# Patient Record
Sex: FEMALE | Race: WHITE | NOT HISPANIC OR LATINO
[De-identification: names, ages, dates, MRNs, and addresses within clinical notes are randomized per-mention and may not be internally consistent; named-entity substitution may affect disease eponyms.]

---

## 2017-05-16 ENCOUNTER — TRANSCRIPTION ENCOUNTER (OUTPATIENT)
Age: 62
End: 2017-05-16

## 2018-06-27 ENCOUNTER — TRANSCRIPTION ENCOUNTER (OUTPATIENT)
Age: 63
End: 2018-06-27

## 2019-04-10 ENCOUNTER — RECORD ABSTRACTING (OUTPATIENT)
Age: 64
End: 2019-04-10

## 2019-04-10 DIAGNOSIS — M65.30 TRIGGER FINGER, UNSPECIFIED FINGER: ICD-10-CM

## 2019-04-10 DIAGNOSIS — G43.909 MIGRAINE, UNSPECIFIED, NOT INTRACTABLE, W/OUT STATUS MIGRAINOSUS: ICD-10-CM

## 2019-04-10 PROBLEM — Z00.00 ENCOUNTER FOR PREVENTIVE HEALTH EXAMINATION: Status: ACTIVE | Noted: 2019-04-10

## 2019-04-10 RX ORDER — PROPRANOLOL HYDROCHLORIDE 60 MG/1
60 TABLET ORAL
Refills: 0 | Status: ACTIVE | COMMUNITY

## 2019-04-10 RX ORDER — BUTALB/ACETAMINOPHEN/CAFFEINE 50-325-40
TABLET ORAL
Refills: 0 | Status: ACTIVE | COMMUNITY

## 2019-04-10 RX ORDER — DICLOFENAC SODIUM 75 MG/1
75 TABLET, DELAYED RELEASE ORAL
Refills: 0 | Status: ACTIVE | COMMUNITY

## 2019-04-10 RX ORDER — ESTRADIOL 2 MG/1
SYSTEM VAGINAL
Refills: 0 | Status: ACTIVE | COMMUNITY

## 2019-04-10 RX ORDER — TRIAMCINOLONE ACETONIDE 40 MG/ML
40 INJECTION, SUSPENSION INTRA-ARTICULAR; INTRAMUSCULAR
Refills: 0 | Status: ACTIVE | COMMUNITY

## 2019-04-17 ENCOUNTER — APPOINTMENT (OUTPATIENT)
Dept: ORTHOPEDIC SURGERY | Facility: CLINIC | Age: 64
End: 2019-04-17
Payer: MEDICARE

## 2019-04-17 VITALS — WEIGHT: 135 LBS | HEIGHT: 66 IN | BODY MASS INDEX: 21.69 KG/M2

## 2019-04-17 DIAGNOSIS — M25.512 PAIN IN LEFT SHOULDER: ICD-10-CM

## 2019-04-17 PROCEDURE — 20552 NJX 1/MLT TRIGGER POINT 1/2: CPT | Mod: LT

## 2019-04-17 PROCEDURE — 99214 OFFICE O/P EST MOD 30 MIN: CPT | Mod: 25

## 2019-04-17 NOTE — DISCUSSION/SUMMARY
[de-identified] : Patient reports a one month history of left-sided periscapular shoulder pain. No specific injury pain is worse when sitting at a desk\par \par None pain in the shoulder no radiating pain numbness or weakness\par \par Trigger point injections administered\par Home exercise demonstrated provided\par Patient referred to my website to watch videos regarding trigger point explanation and exercise program

## 2019-04-17 NOTE — HISTORY OF PRESENT ILLNESS
[Pain Location] : pain [] : left shoulder [6] : a current pain level of 6/10 [Intermit.] : ~He/She~ states the symptoms seem to be intermittent [Lifting] : worsened by lifting [de-identified] : LEFT SHOULDER\par MARCH 2019\par INTERMITTENT PAIN\par 6/10 PAIN LEVEL\par SHARP PAIN\par WORSE WHEN LIFTING\par \par PREVIOUS RIGHT SHOULDER PAIN

## 2019-04-17 NOTE — PROCEDURE
[de-identified] : Patient has demonstrated limited relief from NSAIDS, rest, exercises / PT, and after discussion of the risks and benefits, the patient elected to proceed with a trigger point injection into the \par LEFT LEVATOR and TRAPEZIOUS\par \par \par  I confirmed no prior adverse reactions, no active infections, and no relevant allergies. \par The skin was prepped in the usual sterile manner. The site was injected with local anesthetic followed by local needling. \par The injection was completed without complication and a bandage was applied.\par  \par The patient tolerated the procedure well and was given post-injection instructions. Cold Tx x 48 hours, analgesics. prn \par Medications: 1.5cc of 1% xylocaine + 1.5cc .25% Bupivicaine + KENALOG 1MG  per site\par

## 2019-05-23 ENCOUNTER — TRANSCRIPTION ENCOUNTER (OUTPATIENT)
Age: 64
End: 2019-05-23

## 2020-06-10 ENCOUNTER — APPOINTMENT (OUTPATIENT)
Dept: ORTHOPEDIC SURGERY | Facility: CLINIC | Age: 65
End: 2020-06-10

## 2020-06-18 ENCOUNTER — APPOINTMENT (OUTPATIENT)
Dept: ORTHOPEDIC SURGERY | Facility: CLINIC | Age: 65
End: 2020-06-18

## 2020-09-10 ENCOUNTER — APPOINTMENT (OUTPATIENT)
Dept: ORTHOPEDIC SURGERY | Facility: CLINIC | Age: 65
End: 2020-09-10
Payer: MEDICARE

## 2020-09-10 VITALS — BODY MASS INDEX: 21.69 KG/M2 | HEIGHT: 66 IN | WEIGHT: 135 LBS

## 2020-09-10 DIAGNOSIS — M77.10 LATERAL EPICONDYLITIS, UNSPECIFIED ELBOW: ICD-10-CM

## 2020-09-10 PROCEDURE — 99214 OFFICE O/P EST MOD 30 MIN: CPT

## 2020-09-10 PROCEDURE — 73070 X-RAY EXAM OF ELBOW: CPT | Mod: 50

## 2020-09-10 NOTE — ASSESSMENT
[FreeTextEntry1] : Discussed at length with patient exam history and imaging.  Patient elected this time counterforce bracing as well as home exercises.  If no improvement patient consider cortisone injection\par

## 2020-09-10 NOTE — PHYSICAL EXAM
[de-identified] : BILATERAL elbow\par Constitutional: \par The patient is healthy-appearing and in no apparent distress. \par \par Cardiovascular System: \par There is capillary refill less than 2 seconds. \par \par Skin: \par There is no skin abnormalities of elbow.\par \par Bilateral Elbow: \par Appearance: \par There is no deformity, induration, redness, swelling, or warmth and a normal carrying angle. \par \par Bony Palpation: \par There is no tenderness of the medial epicondyle.\par There is no tenderness of olecranon.\par There is no tenderness of the ulnatrochlea articulation.\par There is no tenderness of the coronoid process.\par There is no tenderness of the radial head.\par There is no tenderness of the radiocapitellar joint.\par There is tenderness of the lateral epicondyle. \par \par Soft Tissue Palpation: \par There is no tenderness of the ulnar nerve.\par There is no tenderness of the biceps insertion.\par There is no tenderness of the pronator teres.\par There is no tenderness of the flexor carpi ulnaris.\par There is no tenderness of the flexor carpi radialis.\par There is no tenderness of the annular ligament of the radius.\par There is no tenderness of the brachioradialis.\par There is no tenderness of the radial collateral ligament.\par There is no tenderness of the ulnar collateral ligament.\par There is no tenderness of the antecubital fossa.\par There is tenderness of the extensor carpi radialis brevis.\par There is tenderness of the extensor carpi radialis longus.\par \par Range of Motion:  \par There is full range of motion both actively and passively. \par \par Stability:\par There is no dislocation or laxity to testing.\par  \par Strength: \par There is 5/5 elbow flexion, extension, supination and pronation.  \par \par Neurologic:\par There is normal sensation C5-T1 to light touch. \par \par Psychiatric: \par The patient demonstrates a normal mood and affect and is active and alert.\par  [de-identified] : X-ray BILATERAL elbows.  There is no significant bony / soft tissue abnormality, arthritis, or fracture.\par

## 2020-09-10 NOTE — HISTORY OF PRESENT ILLNESS
[de-identified] : Location: Bilateral elbows (right worse than left)- lateral\par Quality: aching, sharp\par Duration: 1 month\par Context: atraumatic\par Aggravating factors: direct pressure, lifting\par Conservative treatment: n/a\par Associated symptoms: swelling\par Prior studies: n/a\par \par

## 2021-12-10 ENCOUNTER — APPOINTMENT (OUTPATIENT)
Dept: ORTHOPEDIC SURGERY | Facility: CLINIC | Age: 66
End: 2021-12-10
Payer: MEDICARE

## 2021-12-10 DIAGNOSIS — M25.551 PAIN IN RIGHT HIP: ICD-10-CM

## 2021-12-10 PROCEDURE — 99214 OFFICE O/P EST MOD 30 MIN: CPT

## 2021-12-10 RX ORDER — METHYLPREDNISOLONE 4 MG/1
4 TABLET ORAL
Qty: 1 | Refills: 2 | Status: ACTIVE | COMMUNITY
Start: 2021-12-10 | End: 1900-01-01

## 2021-12-10 NOTE — DISCUSSION/SUMMARY
[de-identified] : Patient clinical exam and history of consistent with right-sided lumbar radiculopathy. She is clinical exam indicates no restriction and no evidence of any significant arthritis of her hip. Patient and I discussed options she would do well with around a Medrol Dosepak for insertion of the medication and patient will follow if not improved that point an MRI the hip and lumbar spine in order to

## 2021-12-10 NOTE — HISTORY OF PRESENT ILLNESS
[de-identified] : Patient presents today with chronic retracted worsening right hip pain. Patient denies any recent injury pain is ongoing for at least 3 months. He states the pain is in the posterior aspect of her hip is also radiating pain into her groin into her lumbar region as well. She denies a change in bowel or bladder habits or any obvious weakness or numbness in the right lower extremity.

## 2021-12-10 NOTE — PHYSICAL EXAM
[de-identified] : Patient has full passive internal/external rotation of the right hip 90° with no mechanical block or pain. His neurovascular intact distally negative straight leg raising test. Patient does have some mild tenderness to palpation of the right-sided paraspinal lumbar musculature. [de-identified] : Patient has a report of an outside x-ray which shows mild degenerative changes at

## 2021-12-16 ENCOUNTER — TRANSCRIPTION ENCOUNTER (OUTPATIENT)
Age: 66
End: 2021-12-16

## 2021-12-17 ENCOUNTER — TRANSCRIPTION ENCOUNTER (OUTPATIENT)
Age: 66
End: 2021-12-17

## 2021-12-17 DIAGNOSIS — M54.50 LOW BACK PAIN, UNSPECIFIED: ICD-10-CM

## 2022-01-04 ENCOUNTER — TRANSCRIPTION ENCOUNTER (OUTPATIENT)
Age: 67
End: 2022-01-04

## 2022-01-05 ENCOUNTER — NON-APPOINTMENT (OUTPATIENT)
Age: 67
End: 2022-01-05

## 2022-01-06 ENCOUNTER — TRANSCRIPTION ENCOUNTER (OUTPATIENT)
Age: 67
End: 2022-01-06

## 2022-01-07 ENCOUNTER — TRANSCRIPTION ENCOUNTER (OUTPATIENT)
Age: 67
End: 2022-01-07

## 2022-01-07 ENCOUNTER — APPOINTMENT (OUTPATIENT)
Dept: PHYSICAL MEDICINE AND REHAB | Facility: CLINIC | Age: 67
End: 2022-01-07

## 2022-03-27 ENCOUNTER — TRANSCRIPTION ENCOUNTER (OUTPATIENT)
Age: 67
End: 2022-03-27

## 2024-05-01 ENCOUNTER — NON-APPOINTMENT (OUTPATIENT)
Age: 69
End: 2024-05-01

## 2024-05-06 ENCOUNTER — APPOINTMENT (OUTPATIENT)
Dept: ORTHOPEDIC SURGERY | Facility: CLINIC | Age: 69
End: 2024-05-06
Payer: MEDICARE

## 2024-05-06 DIAGNOSIS — M75.41 IMPINGEMENT SYNDROME OF RIGHT SHOULDER: ICD-10-CM

## 2024-05-06 PROCEDURE — 99214 OFFICE O/P EST MOD 30 MIN: CPT | Mod: 25

## 2024-05-06 PROCEDURE — 73030 X-RAY EXAM OF SHOULDER: CPT | Mod: RT

## 2024-05-06 PROCEDURE — 20611 DRAIN/INJ JOINT/BURSA W/US: CPT | Mod: RT

## 2024-05-06 NOTE — DISCUSSION/SUMMARY
[de-identified] : ULTRASOUND SHOULDER PERFORMED , EVALUATED, DOCUMENTED - AND REVIEWED WITH PATIENT EVALUATION  REVEALS INFLAMMATORY CHANGES WITHOUT SIGNIFICANT TENDON TEAR  PATIENT HAS ELECTED TO PROCEED WITH KENALOG INJECTION SHOULDER RISKS AND BENEFITS DISCUSSED - VERBAL CONSENT OBTAINED SEE PROCEDURE NOTE     POST INJECTION INSTRUCTIONS:   INJECTION THERAPY HANDOUT PROVIDED   COLD THERAPY , ANALGESICS PRN   HOME  EXERCISES QD -  SCAP RETRACTION TBAND    CONSIDER  P.T.  WITHIN 2 WEEKS AFTER INJECTION - 2 X 4 WEEKS - PROGRESS TO HOME EXERCISES   CONSIDER REPEAT INJECTION AFTER P.T.    MRI IF NO RELIEF 12 WEEKS

## 2024-05-06 NOTE — PHYSICAL EXAM
[de-identified] : PHYSICAL EXAM  RIGHT  SHOULDER  NECK EXAM  FROM NONTENDER  SPURLING  RIGHT=NEG, LEFT=NEG  MODERATE  SCAPULAR PROTRACTION AROM 150 / 150 / 85 / 25  TENDER: SA REGION LATERAL   SPECIAL TESTING : CAMPOVERDE - POSITIVE  LEOPOLDO - POSITIVE  SPEED TEST - POSITIVE  [place] - NEGATIVE  APPREHENSION AND SUPPRESSION - NEGATIVE   RC STRENGTH TESTING  SS:  5/5 SUB 5/5 IS     5/5 BICEPS  5/5  SENSATION  - GROSSLY INTACT    [de-identified] : RIGHT SHOULDER XRAY (2 VIEWS - AP AND OUTLET) -   NO OBVIOUS FRACTURE ,  SEPARATION OR DISLOCATION  NO SIGNIFICANT OSTEOARTHRITIS, TYPE 2B ACROMION -  LOW LYING  CSA= 33.5  DIAGNOSTIC ULTRASOUND RIGHT SHOULDER  DIAGNOSTIC SONOGRAPHY of the Rotator Cuff Soft Tissue of the RIGHT SHOULDER was performed in Multiple Scan Planes with varying transducer frequencies. Imaging of the Supraspinatus Tendon reveals TENDONITIS, BURSITIS, ARTICULAR SIDE DEGENERATION  WITH OUT SIGNIFICANT / COMPLETE TEAR Imaging of the Biceps Tendon reveals no significant tear. Imaging of the Subscapularis Tendon reveals no significant tear. Imaging of the Infraspinatus Tendon reveals no significant tear. Key images were save digitally and reviewed with patient.

## 2024-05-06 NOTE — PROCEDURE
[de-identified] : INJECTION RIGHT SHOULDER SA SPACE  Patient has demonstrated limited relief from NSAIDS, rest, exercises / PT, and after discussion of the risks and benefits, the patient has elected to proceed with an ULTRASOUND GUIDED injection into the RIGHT SUBACROMIAL  SPACE LATERAL APPROACH    Confirmed that the patient does not have history of prior adverse reactions, active, infections, or relevant allergies. There was no effusion, erythema, or warmth, and the skin was clear  The skin was sterilized with alcohol. Ethyl Chloride was used as a topical anesthetic. Routine sterile technique.  The site was injected UTILIZING ULTRASOUND GUIDANCE to confirm appropriate placement of the needle- with a mixture of medication and local anesthetic. The injection was completed without complication and a bandage was applied.   The patient tolerated the procedure well and was given post-injection instructions.Rec: Cold therapy, analgesics, avoid heavy activity. MEDICATION: 4cc of 1% xylocaine + 10mg of Kenalog LOT# [id number]  EXP 08/24

## 2024-05-06 NOTE — HISTORY OF PRESENT ILLNESS
[de-identified] : LOCATION: RIGHT SHOULDER PAIN- [facility]  DURATION: 1 MONTH AGO-APRIL 2024-   NO SPECIFIC INJURY INTERMITTENT  PAINFUL WHEN DOING TAICHI  PAIN LEVEL:4-5/10  TREATMENTS: REST  AGGRAVATING FACTORS: OVER HEAD LIFTING, LIFTING, BART [facility]   HAD KENALOG INJECTION 2017 WITH GOOD RELIEF  2019 TREATED FOR TRIGGER POINTS WITH GOOD RELIEF